# Patient Record
(demographics unavailable — no encounter records)

---

## 2024-11-05 NOTE — END OF VISIT
[] : Resident [FreeTextEntry3] : otherwise healthy child (with PMH of intermittent asthma) with daily complaints of abd pain, points to periumbilical region child is extremely well-appearing and remains playful parents scheduled appt as child has complained for more than 1 week, thought only intermittently daily normal BMs per father, no prior or current concern for constipation child is unable to describe pain refuses meds like tylenol; father trialed pepto bismol but was discouraged by UC provider (due to presence of salicylates?) recommend OTC Maalox and/or Tums for possible indigestion and gastritis low to no suspicion for abdominal pathology, but ordered abd U/S to eval for mass  no indication for AXR as no concern for obstruction or stool burden reviewed s/sx of acute abdomen for which to seek urgent medical attention

## 2024-11-05 NOTE — DISCUSSION/SUMMARY
[FreeTextEntry1] :  4-year-old male presents with a one-week history of periumbilical pain. He is hemodynamically stable and afebrile. No weight loss. He is overall very well appearing. Normal hydration status. Abdominal exam grossly benign. Differential diagnosis include: viral gastritis, intussusception, UTI, appendicitis, or malignancy. Based on history and exam findings, this is likely case of gastritis. Encourage supportive care and trial of OTC Maalox or Tums for symptom relief. Will check US abdomen and UA to rule out abdominal mass and UTI respectively. Reviewed strict return precautions with parents. They are in agreement with care plan.   Plan:  - Check US abdomen - Check UA

## 2024-11-05 NOTE — HISTORY OF PRESENT ILLNESS
[FreeTextEntry6] : 4-year-old male with no past medical history presents with abdominal pain. His symptoms started approximately eight days ago. He was in his usual state of health when he began to experience pain around his umbilicus. Parents recalled associated symptoms of cough and rhinorrhea. He was seen in urgent care on 10/29 where he was diagnosed with viral syndrome. COVID-19 and Strep testing were negative. At home, parents have had difficulty getting him to take tylenol. He continues to tolerate PO solid and liquid. He continues to have normal bowel movement daily. No fever, sore throat, nausea/vomiting, diarrhea or dysuria. He is enrolled in . Immunizations are up to date.

## 2024-11-05 NOTE — PHYSICAL EXAM
[Normal Bowel Sounds] : normal bowel sounds [Hepatosplenomegaly] : no hepatosplenomegaly [Normal external genitalia] : normal external genitalia [Circumcised] : circumcised [Moves All Extremities x 4] : moves all extremities x4 [Warm, Well Perfused x4] : warm, well perfused x4 [Alert] : alert [EOMI] : grossly EOMI [Pink Nasal Mucosa] : pink nasal mucosa [Supple] : supple [FROM] : full passive range of motion [Clear to Auscultation Bilaterally] : clear to auscultation bilaterally [Regular Rate and Rhythm] : regular rate and rhythm [Normal S1, S2 audible] : normal S1, S2 audible [Soft] : soft [Serge: ____] : Serge [unfilled] [Acute Distress] : no acute distress [Traumatic] : atraumatic [Conjuctival Injection] : no conjunctival injection [Eyelid Swelling] : no eyelid swelling [Erythematous Oropharynx] : nonerythematous oropharynx [Enlarged Tonsils] : tonsils not enlarged [Wheezing] : no wheezing [Murmur] : no murmur [Tender] : nontender [Distended] : nondistended [Hepatomegaly] : no hepatomegaly [Mass] : no mass palpable [McBurney's point tenderness] : no McBurney's point tenderness [Rebound tenderness] : no rebound tenderness [Urethral Discharge] : no urethral discharge [Undescended Testicle] : descended testicle(s) [FreeTextEntry1] : extremely well-appearing [FreeTextEntry7] : breathing comfortably [FreeTextEntry6] : (+) Cremasteric reflex bilaterally

## 2024-11-05 NOTE — REVIEW OF SYSTEMS
[Congestion] : no congestion [Abdominal Pain] : abdominal pain [Fever] : no fever [Malaise] : no malaise [Headache] : no headache [Nasal Discharge] : no nasal discharge [Nasal Congestion] : no nasal congestion [Sore Throat] : no sore throat [Tachypnea] : not tachypneic [Wheezing] : no wheezing [Cough] : no cough [Shortness of Breath] : no shortness of breath [Appetite Changes] : no appetite changes [Intolerance to feeds] : tolerance to feeds [Vomiting] : no vomiting [Diarrhea] : no diarrhea [Constipation] : no constipation [Gaseous] : not gaseous [Weakness] : no weakness [Lightheadness] : no lightheadness [Dizziness] : no dizziness [Myalgia] : no myalgia [Rash] : no rash [Dysuria] : no dysuria [Hematuria] : no hematuria [Urine Volume Has Decreased] : urine volume has not decreased

## 2025-01-24 NOTE — ASSESSMENT
[FreeTextEntry1] : KIRTI, 5 year old boy with history and physical examination consistent with persistent asthma. Positive asthma risk factors support asthma diagnosis. Additionally, **. Severity of symptoms and increased risk of asthma complications, including severe respiratory attacks requiring oral steroids, warrant the use of controller medications for adequate asthma control. Discussed asthma etiology, triggers, treatment and prognosis.   Seasonal allergies are often present in asthma and can be a trigger and may lead to worsened severity and persistence of asthma. The use of medical treatment (e.g. antihistamines) and avoidance measure are beneficial. Allergy testing **.   Other diagnoses and confounders were considered but given the leading diagnosis these are unlikely. Risk for severe flu and COVID-19 viral infections is higher in RAD/Asthma patients, vaccination is recommended.   Discussed above assessment, management plan and potential medication side effects. Parent agreed with plan. All queries were answered. Evaluation includes normal saturation. Time excludes separately reported services.  Recommend: - DAILY CONTROLLER INHALER: Start Fluticasone Propionate 44 mcg, 2 puffs twice daily (continue even if doing well). - RESCUE AS NEEDED INHALER: Albuterol, 2 - 4 puffs every 4 - 6 hours, "as needed" for cough, shortness of breath or wheeze (rescue inhaler) - Annual influenza vaccination. - Follow-up in 5-6 months. - Simple PFT at next visit.   AT THE FIRST SIGN OF ANY RESPIRATORY INFECTION: - Start Albuterol every 4 hours until symptoms resolve. - If needing Albuterol more than every 4 hours should bring child to ED. - Medical evaluation may be needed if respiratory symptoms persist after 3-4 days.   QUESTIONS: [phone].  Dr. Zion Mcknight

## 2025-01-24 NOTE — END OF VISIT
[FreeTextEntry3] : I, Kavitha Calles RN, have acted as a scribe and documented the HPI information for Dr. Mcknight. The HPI documentation completed by the scribe is an accurate record of both my words and actions.

## 2025-01-24 NOTE — HISTORY OF PRESENT ILLNESS
[FreeTextEntry1] : KIRTI is a 5 year old boy with barky cough. PMD suspected Asthma.  Pertinent PMH: FT, birth weight 5lbs 15 oz, - small for gestational age, no NICU stay, never intubated, no oxygen. Had breath holding spells as a baby that resolved on its own.   RESPIRATORY HISTORY - Symptoms when sick: barky cough, runny nose, no retractions - Symptoms when well: denies - Symptoms with activity: denies - Albuterol or ICS use: uses Albuterol with spacer when sick. Does help with symptoms.  - ER visits - Admissions: in last year no ER visit, but 4 respiratory PMD visits - Oral steroid use: +Steroids shot 2 month ago.  - FMH of Asthma: +Dad - Eczema: denies  - Allergies (food/environment): denies - Frequent AOM: denies  - Snore frequently and loud: only when sick, never done sleep study, never saw ENT - Vaccinations: up-to-date. UTD, no flu shot- plans to get at PMD - Covid history & vaccination status: yes has had COVID when he was just under 2 years old with fever x 1 days, had no respiratory symptoms, no vaccination - Additional history (pets - smoke exposure): No pets, carpet in basement which is his play area, no smoke exposure  ___________________________________________________________________________________ Asthma Control Test - Asthma symptoms in the last 4 weeks:  1. Rate your asthma today?                          3-very good, 2-good, 1-bad, 0-very bad.     Score: 2. Activity induced symptoms? 3-very good, 2-sometimes, 1-frequently, 0-all the time.    Score: 3. How is cough?      3-none of the time, 2-sometimes, 1 -most time, 0-all the time.           Score: 4. Nighttime awakening?          3-none, 2-sometimes, 1-most time, 0-all the time.               Score: 5. Symptoms presented 5) none, 4) 1 - 3 times, 3) 4 - 10 times, 2) 11 - 18 time, 1) 19 - 24 times, 0) everyday. ---Daytime stx?   Score: ---Wheezing?      Score: ---Wake up?        Score:  Total score: **  If </or 19, asthma may not be controlled as well as it could be.

## 2025-01-24 NOTE — ASSESSMENT
[FreeTextEntry1] : KIRTI, 5 year old boy with history and physical examination consistent with persistent asthma. Positive asthma risk factors support asthma diagnosis. Additionally, **. Severity of symptoms and increased risk of asthma complications, including severe respiratory attacks requiring oral steroids, warrant the use of controller medications for adequate asthma control. Discussed asthma etiology, triggers, treatment and prognosis.   Seasonal allergies are often present in asthma and can be a trigger and may lead to worsened severity and persistence of asthma. The use of medical treatment (e.g. antihistamines) and avoidance measure are beneficial. Allergy testing **.   Other diagnoses and confounders were considered but given the leading diagnosis these are unlikely. Risk for severe flu and COVID-19 viral infections is higher in RAD/Asthma patients, vaccination is recommended.   Discussed above assessment, management plan and potential medication side effects. Parent agreed with plan. All queries were answered. Evaluation includes normal saturation. Time excludes separately reported services.  Recommend: - DAILY CONTROLLER INHALER: Start Fluticasone Propionate 44 mcg, 2 puffs twice daily (continue even if doing well). - RESCUE AS NEEDED INHALER: Albuterol, 2 - 4 puffs every 4 - 6 hours, "as needed" for cough, shortness of breath or wheeze (rescue inhaler) - Annual influenza vaccination. - Follow-up in 5-6 months. - Simple PFT at next visit.   AT THE FIRST SIGN OF ANY RESPIRATORY INFECTION: - Start Albuterol every 4 hours until symptoms resolve. - If needing Albuterol more than every 4 hours should bring child to ED. - Medical evaluation may be needed if respiratory symptoms persist after 3-4 days.   QUESTIONS: [phone].  Dr. Zion Mcknight

## 2025-03-08 NOTE — DISCUSSION/SUMMARY
[FreeTextEntry1] :   Objective:   - Diagnostic Results: Pending stool sample test for pinworm detection and ova and parasites.   Assessment and Plan:   - [Suspected Pinworm Infection]: Considering the symptoms and the usual age group, Pedros anal itch could be possibly due to Pinworm Infection.     - Therapeutic Interventions: Start albendazole 400mg treatment to be taken on an empty stomach has been suggested. Another dose to be taken in 2 weeks. Given Woo's age, medication can be crushed and mixed with food to help with ingestion, if necessary.      - Diagnostic Tests: A stool sample test has been recommended and arranged for Woo, to verify the presence of pinworms and/or other parasites. An alternative home-based tape test was discussed but decided against.      - Patient Education: Woo's parents were educated about pinworm infections and the nature of the antiparasitic medication.

## 2025-03-08 NOTE — HISTORY OF PRESENT ILLNESS
[FreeTextEntry6] : Subjective:  Summary: Woo, a 5-year-old male, presents with complaints of anal discomfort for the past week or two, mostly at night. Family reports it appears to be more of an itch rather than pain as he often asks for his backside to be wiped though it's clean.  Chief Complaint (CC): Anal discomfort, primarily at night.  History of Present Illness: Woo has been experiencing discomfort in his anal region for the past one to two weeks. The discomfort is mainly at night, and he asks his parents to wipe his anus, although it is clean. The discomfort appears to be more of itchiness, according to his parents. The family has recently been to a water park and experienced a bout of stomach flu last week. This anal discomfort had begun even before they fell sick with the stomach flu. Woo tries to indicate that his discomfort is inside his anus. He does not have any pain when urinating.  Social History:     - Attends school     - Recently visited a water park  Review of Systems:     - General: No report of weight loss, gain, or changes in appetite.     - Neurological: No reported headaches, dizziness or changes in coordination.     - Musculoskeletal: Absence of joint pain or stiffness.     - Cardiovascular: No complaints of chest pain or changes in heart rhythm.     - Respiratory: Has no reported coughing or other respiratory symptoms.     - Gastrointestinal: Had a recent bout of stomach flu, but no features of severe abdominal pain, bloating, or changes in bowel habits.     - Genitourinary: No complaints of pain when urinating or changes in urinary patterns.     - Integumentary: No reported rashes or changes in skin.     - Psychiatric: No reported changes in mood or other psychiatric symptoms.

## 2025-03-08 NOTE — PHYSICAL EXAM
[Normal external genitalia] : normal external genitalia [Patent] : patent [NL] : warm, clear [Urethral Discharge] : no urethral discharge [Penile Adhesion] : no penile adhesion [Undescended Testicle] : descended testicle(s) [Retractile Testicle] : no retractile testicle [Hydrocele] : no hydrocele [Varicocele] : no varicocele [Anal Fissure] : no anal fissure [Erythema surrounding anus] : no erythema surrounding anus [Fissure] : no fissure [de-identified] : Specific examination of the anal region showed no significant visible abnormalities; skin looks healthy with no signs of inflammation.

## 2025-05-20 NOTE — HISTORY OF PRESENT ILLNESS
[Father] : father [Fruit] : fruit [Vegetables] : vegetables [Meat] : meat [Grains] : grains [Dairy] : dairy [Normal] : Normal [Brushing teeth] : Brushing teeth [Yes] : Patient goes to dentist yearly [Toothpaste] : Primary Fluoride Source: Toothpaste [Playtime (60 min/d)] : Playtime 60 min a day [Appropiate parent-child-sibling interaction] : Appropriate parent-child-sibling interaction [Child Cooperates] : Child cooperates [Parent has appropriate responses to behavior] : Parent has appropriate responses to behavior [In Pre-K] : In Pre-K [Adequate performance] : Adequate performance [Adequate attention] : Adequate attention [No difficulties with Homework] : No difficulties with homework  [No] : Not at  exposure [Water heater temperature set at <120 degrees F] : Water heater temperature set at <120 degrees F [Car seat in back seat] : Car seat in back seat [Carbon Monoxide Detectors] : Carbon monoxide detectors [Smoke Detectors] : Smoke detectors [Supervised outdoor play] : Supervised outdoor play [Up to date] : Up to date [NO] : No [de-identified] : drinks whole milk

## 2025-05-20 NOTE — PHYSICAL EXAM
[Alert] : alert [No Acute Distress] : no acute distress [Playful] : playful [Normocephalic] : normocephalic [Conjunctivae with no discharge] : conjunctivae with no discharge [PERRL] : PERRL [EOMI Bilateral] : EOMI bilateral [Auricles Well Formed] : auricles well formed [No Discharge] : no discharge [Nares Patent] : nares patent [Pink Nasal Mucosa] : pink nasal mucosa [Palate Intact] : palate intact [Uvula Midline] : uvula midline [Nonerythematous Oropharynx] : nonerythematous oropharynx [No Caries] : no caries [Trachea Midline] : trachea midline [Supple, full passive range of motion] : supple, full passive range of motion [No Palpable Masses] : no palpable masses [Symmetric Chest Rise] : symmetric chest rise [Clear to Auscultation Bilaterally] : clear to auscultation bilaterally [Normoactive Precordium] : normoactive precordium [Regular Rate and Rhythm] : regular rate and rhythm [Normal S1, S2 present] : normal S1, S2 present [No Murmurs] : no murmurs [+2 Femoral Pulses] : +2 femoral pulses [Soft] : soft [NonTender] : non tender [Non Distended] : non distended [Normoactive Bowel Sounds] : normoactive bowel sounds [No Hepatomegaly] : no hepatomegaly [No Splenomegaly] : no splenomegaly [Sereg 1] : Serge 1 [Central Urethral Opening] : central urethral opening [Testicles Descended Bilaterally] : testicles descended bilaterally [Patent] : patent [Normally Placed] : normally placed [No Abnormal Lymph Nodes Palpated] : no abnormal lymph nodes palpated [Symmetric Buttocks Creases] : symmetric buttocks creases [Symmetric Hip Rotation] : symmetric hip rotation [No Gait Asymmetry] : no gait asymmetry [No pain or deformities with palpation of bone, muscles, joints] : no pain or deformities with palpation of bone, muscles, joints [Normal Muscle Tone] : normal muscle tone [No Spinal Dimple] : no spinal dimple [NoTuft of Hair] : no tuft of hair [Straight] : straight [+2 Patella DTR] : +2 patella DTR [Cranial Nerves Grossly Intact] : cranial nerves grossly intact [No Rash or Lesions] : no rash or lesions [FreeTextEntry3] : L- impacted wax, R-mild wax TM clear

## 2025-05-20 NOTE — ASSESSMENT
[TextEntry] : #Asthma- mild persistent Controller: Asmaznex 50 mc/act 2 puffs BID Rescue: Albuterol with spacer Pulm: last visit 1/2025  #[facility] *DEVELOPMENT: meeting all milestones, no concerns *DIET/GROWTH: varied diet, appropriate growth velocities; discussed switching whole milk for 2% or skim milk to decrease excess calories  *DENTAL/VISION: goes to dentist, brushing teeth; passed vision screen; failed hearing screen  -Impacted cerumen-- debrox drops for L ear -Repeat hearing screen 2 months   *VACCINES: UTD *NEXT APPT: 1 year for [facility]  *ANTICIPATORY GUIDANCE -School:Support academic progress and social-emotional learning. Address any learning or behavioral challenges early. Encourage reading and involvement in extracurricular activities. -Safety:Reinforce street safety, bike safety (helmet!), and online safety. Discuss appropriate and inappropriate touch. -Health: Promote healthy habits like balanced nutrition, regular exercise, and sufficient sleep.  Schedule regular checkups and discuss any concerns with their doctor. -Development Encourage independence, problem-solving skills, and responsibility.  Provide opportunities for creative expression and social interaction.  Be mindful of emotional development and address any anxieties or fears. -Family: Maintain open communication and provide a supportive and loving environment. Establish clear rules and expectations.

## 2025-05-20 NOTE — HISTORY OF PRESENT ILLNESS
[Father] : father [Fruit] : fruit [Vegetables] : vegetables [Meat] : meat [Grains] : grains [Dairy] : dairy [Normal] : Normal [Brushing teeth] : Brushing teeth [Yes] : Patient goes to dentist yearly [Toothpaste] : Primary Fluoride Source: Toothpaste [Playtime (60 min/d)] : Playtime 60 min a day [Appropiate parent-child-sibling interaction] : Appropriate parent-child-sibling interaction [Child Cooperates] : Child cooperates [Parent has appropriate responses to behavior] : Parent has appropriate responses to behavior [In Pre-K] : In Pre-K [Adequate performance] : Adequate performance [Adequate attention] : Adequate attention [No difficulties with Homework] : No difficulties with homework  [No] : Not at  exposure [Water heater temperature set at <120 degrees F] : Water heater temperature set at <120 degrees F [Car seat in back seat] : Car seat in back seat [Carbon Monoxide Detectors] : Carbon monoxide detectors [Smoke Detectors] : Smoke detectors [Supervised outdoor play] : Supervised outdoor play [Up to date] : Up to date [NO] : No [de-identified] : drinks whole milk

## 2025-07-28 NOTE — HISTORY OF PRESENT ILLNESS
[de-identified] : Repeat Hearing Screen [FreeTextEntry6] : Woo is a 4 yo M with L impacted cerumen and failed hearing screen at last [facility]. Started on debrox drops and states that they think his hearing has improved. No recent AOM.

## 2025-07-28 NOTE — HISTORY OF PRESENT ILLNESS
[de-identified] : Repeat Hearing Screen [FreeTextEntry6] : Woo is a 4 yo M with L impacted cerumen and failed hearing screen at last [facility]. Started on debrox drops and states that they think his hearing has improved. No recent AOM.